# Patient Record
Sex: MALE | Race: WHITE | Employment: FULL TIME | URBAN - METROPOLITAN AREA
[De-identification: names, ages, dates, MRNs, and addresses within clinical notes are randomized per-mention and may not be internally consistent; named-entity substitution may affect disease eponyms.]

---

## 2021-02-25 ENCOUNTER — APPOINTMENT (OUTPATIENT)
Dept: GENERAL RADIOLOGY | Age: 41
End: 2021-02-25
Payer: COMMERCIAL

## 2021-02-25 ENCOUNTER — HOSPITAL ENCOUNTER (OUTPATIENT)
Age: 41
Setting detail: OBSERVATION
Discharge: HOME | End: 2021-02-26
Attending: INTERNAL MEDICINE | Admitting: INTERNAL MEDICINE
Payer: COMMERCIAL

## 2021-02-25 DIAGNOSIS — R07.9 CHEST PAIN, UNSPECIFIED TYPE: Primary | ICD-10-CM

## 2021-02-25 LAB
ALBUMIN SERPL-MCNC: 4.2 G/DL (ref 3.5–4.6)
ALP BLD-CCNC: 100 U/L (ref 35–104)
ALT SERPL-CCNC: 22 U/L (ref 0–41)
ANION GAP SERPL CALCULATED.3IONS-SCNC: 10 MEQ/L (ref 9–15)
AST SERPL-CCNC: 24 U/L (ref 0–40)
BASOPHILS ABSOLUTE: 0.1 K/UL (ref 0–0.2)
BASOPHILS RELATIVE PERCENT: 0.8 %
BILIRUB SERPL-MCNC: 0.3 MG/DL (ref 0.2–0.7)
BUN BLDV-MCNC: 10 MG/DL (ref 6–20)
CALCIUM SERPL-MCNC: 8.7 MG/DL (ref 8.5–9.9)
CHLORIDE BLD-SCNC: 106 MEQ/L (ref 95–107)
CO2: 24 MEQ/L (ref 20–31)
CREAT SERPL-MCNC: 0.75 MG/DL (ref 0.7–1.2)
EKG ATRIAL RATE: 55 BPM
EKG P AXIS: 32 DEGREES
EKG P-R INTERVAL: 138 MS
EKG Q-T INTERVAL: 430 MS
EKG QRS DURATION: 98 MS
EKG QTC CALCULATION (BAZETT): 411 MS
EKG R AXIS: 25 DEGREES
EKG T AXIS: 19 DEGREES
EKG VENTRICULAR RATE: 55 BPM
EOSINOPHILS ABSOLUTE: 0.2 K/UL (ref 0–0.7)
EOSINOPHILS RELATIVE PERCENT: 2.2 %
GFR AFRICAN AMERICAN: >60
GFR NON-AFRICAN AMERICAN: >60
GLOBULIN: 3 G/DL (ref 2.3–3.5)
GLUCOSE BLD-MCNC: 82 MG/DL (ref 70–99)
HCT VFR BLD CALC: 43.6 % (ref 42–52)
HEMOGLOBIN: 14.8 G/DL (ref 14–18)
LYMPHOCYTES ABSOLUTE: 2.8 K/UL (ref 1–4.8)
LYMPHOCYTES RELATIVE PERCENT: 33.8 %
MCH RBC QN AUTO: 30.9 PG (ref 27–31.3)
MCHC RBC AUTO-ENTMCNC: 34 % (ref 33–37)
MCV RBC AUTO: 90.8 FL (ref 80–100)
MONOCYTES ABSOLUTE: 1 K/UL (ref 0.2–0.8)
MONOCYTES RELATIVE PERCENT: 12 %
NEUTROPHILS ABSOLUTE: 4.3 K/UL (ref 1.4–6.5)
NEUTROPHILS RELATIVE PERCENT: 51.2 %
PDW BLD-RTO: 13.7 % (ref 11.5–14.5)
PLATELET # BLD: 240 K/UL (ref 130–400)
POTASSIUM SERPL-SCNC: 3.9 MEQ/L (ref 3.4–4.9)
RBC # BLD: 4.81 M/UL (ref 4.7–6.1)
SARS-COV-2, NAAT: NOT DETECTED
SODIUM BLD-SCNC: 140 MEQ/L (ref 135–144)
TOTAL PROTEIN: 7.2 G/DL (ref 6.3–8)
TROPONIN: <0.01 NG/ML (ref 0–0.01)
TROPONIN: <0.01 NG/ML (ref 0–0.01)
WBC # BLD: 8.4 K/UL (ref 4.8–10.8)

## 2021-02-25 PROCEDURE — 71045 X-RAY EXAM CHEST 1 VIEW: CPT

## 2021-02-25 PROCEDURE — G0378 HOSPITAL OBSERVATION PER HR: HCPCS

## 2021-02-25 PROCEDURE — 85025 COMPLETE CBC W/AUTO DIFF WBC: CPT

## 2021-02-25 PROCEDURE — 93005 ELECTROCARDIOGRAM TRACING: CPT | Performed by: PERSONAL EMERGENCY RESPONSE ATTENDANT

## 2021-02-25 PROCEDURE — 84484 ASSAY OF TROPONIN QUANT: CPT

## 2021-02-25 PROCEDURE — 6370000000 HC RX 637 (ALT 250 FOR IP): Performed by: PERSONAL EMERGENCY RESPONSE ATTENDANT

## 2021-02-25 PROCEDURE — 80053 COMPREHEN METABOLIC PANEL: CPT

## 2021-02-25 PROCEDURE — 36415 COLL VENOUS BLD VENIPUNCTURE: CPT

## 2021-02-25 PROCEDURE — 87635 SARS-COV-2 COVID-19 AMP PRB: CPT

## 2021-02-25 PROCEDURE — 99285 EMERGENCY DEPT VISIT HI MDM: CPT

## 2021-02-25 RX ORDER — ONDANSETRON 2 MG/ML
4 INJECTION INTRAMUSCULAR; INTRAVENOUS EVERY 6 HOURS PRN
Status: DISCONTINUED | OUTPATIENT
Start: 2021-02-25 | End: 2021-02-26 | Stop reason: HOSPADM

## 2021-02-25 RX ORDER — SODIUM CHLORIDE 0.9 % (FLUSH) 0.9 %
10 SYRINGE (ML) INJECTION EVERY 12 HOURS SCHEDULED
Status: DISCONTINUED | OUTPATIENT
Start: 2021-02-25 | End: 2021-02-26 | Stop reason: HOSPADM

## 2021-02-25 RX ORDER — PROMETHAZINE HYDROCHLORIDE 12.5 MG/1
12.5 TABLET ORAL EVERY 6 HOURS PRN
Status: DISCONTINUED | OUTPATIENT
Start: 2021-02-25 | End: 2021-02-26 | Stop reason: HOSPADM

## 2021-02-25 RX ORDER — ACETAMINOPHEN 650 MG/1
650 SUPPOSITORY RECTAL EVERY 6 HOURS PRN
Status: DISCONTINUED | OUTPATIENT
Start: 2021-02-25 | End: 2021-02-26 | Stop reason: HOSPADM

## 2021-02-25 RX ORDER — SODIUM CHLORIDE 0.9 % (FLUSH) 0.9 %
10 SYRINGE (ML) INJECTION PRN
Status: DISCONTINUED | OUTPATIENT
Start: 2021-02-25 | End: 2021-02-26 | Stop reason: HOSPADM

## 2021-02-25 RX ORDER — NITROGLYCERIN 0.4 MG/1
0.4 TABLET SUBLINGUAL EVERY 5 MIN PRN
Status: DISCONTINUED | OUTPATIENT
Start: 2021-02-25 | End: 2021-02-26 | Stop reason: HOSPADM

## 2021-02-25 RX ORDER — ASPIRIN 81 MG/1
81 TABLET, CHEWABLE ORAL DAILY
Status: DISCONTINUED | OUTPATIENT
Start: 2021-02-26 | End: 2021-02-26 | Stop reason: HOSPADM

## 2021-02-25 RX ORDER — ACETAMINOPHEN 325 MG/1
650 TABLET ORAL EVERY 6 HOURS PRN
Status: DISCONTINUED | OUTPATIENT
Start: 2021-02-25 | End: 2021-02-26 | Stop reason: HOSPADM

## 2021-02-25 RX ORDER — POLYETHYLENE GLYCOL 3350 17 G/17G
17 POWDER, FOR SOLUTION ORAL DAILY PRN
Status: DISCONTINUED | OUTPATIENT
Start: 2021-02-25 | End: 2021-02-26 | Stop reason: HOSPADM

## 2021-02-25 RX ADMIN — NITROGLYCERIN 1 INCH: 20 OINTMENT TOPICAL at 20:25

## 2021-02-25 RX ADMIN — NITROGLYCERIN 0.4 MG: 0.4 TABLET, ORALLY DISINTEGRATING SUBLINGUAL at 18:55

## 2021-02-25 SDOH — HEALTH STABILITY: MENTAL HEALTH: HOW OFTEN DO YOU HAVE A DRINK CONTAINING ALCOHOL?: NEVER

## 2021-02-25 ASSESSMENT — ENCOUNTER SYMPTOMS
COLOR CHANGE: 0
NAUSEA: 0
BLOOD IN STOOL: 0
RHINORRHEA: 0
COUGH: 0
VOMITING: 0
SHORTNESS OF BREATH: 0
CHEST TIGHTNESS: 1
SORE THROAT: 0
ABDOMINAL PAIN: 0
DIARRHEA: 0

## 2021-02-25 ASSESSMENT — PAIN SCALES - GENERAL
PAINLEVEL_OUTOF10: 0
PAINLEVEL_OUTOF10: 4

## 2021-02-25 ASSESSMENT — PAIN DESCRIPTION - ORIENTATION: ORIENTATION: MID;LEFT

## 2021-02-25 ASSESSMENT — HEART SCORE: ECG: 0

## 2021-02-25 ASSESSMENT — PAIN DESCRIPTION - ONSET: ONSET: ON-GOING

## 2021-02-25 ASSESSMENT — PAIN DESCRIPTION - PAIN TYPE: TYPE: ACUTE PAIN

## 2021-02-25 NOTE — ED PROVIDER NOTES
[address] [facility] ED  eMERGENCY dEPARTMENT eNCOUnter      Pt Name: Nathaniel Sow  MRN: [id number]  [place] 1980  Date of evaluation: 2/25/2021  Provider: Mary Loaiza, [address]    Nathaniel Sow is a 35 y.o. male with PMHx of borderline HTN presents to the emergency department with chest pain. Pt does smoke almost 1 pack a day. He woke up this morning with left sided chest tightness that is worse with exertion and relieved with rest. He is lightheaded with paresthesias down left arm. He has never had this pain before. He does admit to normal life stress but nothing significant. He has never had this pain before. He states his father in his late 42's had his first MI and then passed away of 2nd MI. He denies fevers, cough, shortness of breath, abdominal pain, nausea, vomiting. He took aspirin 324 mg this morning with some relief of pain. Minimal chest pain at this time. HPI    Nursing Notes were reviewed. REVIEW OF SYSTEMS       Review of Systems   Constitutional: Negative for appetite change, chills and fever. HENT: Negative for congestion, rhinorrhea and sore throat. Respiratory: Positive for chest tightness. Negative for cough and shortness of breath. Cardiovascular: Positive for chest pain. Gastrointestinal: Negative for abdominal pain, blood in stool, diarrhea, nausea and vomiting. Genitourinary: Negative for difficulty urinating. Musculoskeletal: Negative for neck stiffness. Skin: Negative for color change and rash. Neurological: Positive for light-headedness. Negative for dizziness, syncope, weakness, numbness and headaches. All other systems reviewed and are negative.             PAST MEDICAL HISTORY     Past Medical History:   Diagnosis Date    Hypertension          SURGICAL HISTORY       Past Surgical History:   Procedure Laterality Date    ANKLE FRACTURE SURGERY           CURRENT MEDICATIONS       Previous Medications No medications on file       ALLERGIES     Patient has no known allergies. FAMILY HISTORY     History reviewed. No pertinent family history. SOCIAL HISTORY       Social History     Socioeconomic History    Marital status:      Spouse name: None    Number of children: None    Years of education: None    Highest education level: None   Occupational History    None   Social Needs    Financial resource strain: None    Food insecurity     Worry: None     Inability: None    Transportation needs     Medical: None     Non-medical: None   Tobacco Use    Smoking status: Current Every Day Smoker     Packs/day: 0.50     Types: Cigarettes    Smokeless tobacco: Former User   Substance and Sexual Activity    Alcohol use: Never     Frequency: Never    Drug use: Never    Sexual activity: None   Lifestyle    Physical activity     Days per week: None     Minutes per session: None    Stress: None   Relationships    Social connections     Talks on phone: None     Gets together: None     Attends Latter-day service: None     Active member of club or organization: None     Attends meetings of clubs or organizations: None     Relationship status: None    Intimate partner violence     Fear of current or ex partner: None     Emotionally abused: None     Physically abused: None     Forced sexual activity: None   Other Topics Concern    None   Social History Narrative    None         PHYSICAL EXAM         ED Triage Vitals [02/25/21 1837]   BP Temp Temp Source Pulse Resp SpO2 Height Weight   (!) 163/89 98 °F (36.7 °C) Oral 83 22 99 % 6' (1.829 m) 235 lb (106.6 kg)       Physical Exam  Constitutional:       Appearance: He is well-developed. HENT:      Head: Normocephalic and atraumatic. Eyes:      Conjunctiva/sclera: Conjunctivae normal.      Pupils: Pupils are equal, round, and reactive to light. Neck:      Musculoskeletal: Normal range of motion and neck supple. Trachea: No tracheal deviation. Cardiovascular:      Heart sounds: Normal heart sounds. Pulmonary:      Effort: Pulmonary effort is normal. No respiratory distress. Breath sounds: Normal breath sounds. No stridor. Chest:      Chest wall: No tenderness. Abdominal:      General: Bowel sounds are normal. There is no distension. Palpations: Abdomen is soft. There is no mass. Tenderness: There is no abdominal tenderness. There is no guarding or rebound. Musculoskeletal: Normal range of motion. Skin:     General: Skin is warm and dry. Capillary Refill: Capillary refill takes less than 2 seconds. Findings: No rash. Neurological:      Mental Status: He is alert and oriented to person, place, and time. Deep Tendon Reflexes: Reflexes are normal and symmetric. Psychiatric:         Behavior: Behavior normal.         Thought Content: Thought content normal.         Judgment: Judgment normal.         DIAGNOSTIC RESULTS     EKG:All EKG's are interpreted by the Emergency Department Physician who either signs or Co-signs this chart in the absence of a cardiologist.    Sinus bradycardia, rate 55, normal intervals, normal axis, no ST segment changes    RADIOLOGY:   Non-plain film images such as CT, Ultrasound and MRI are read by theradiologist. Plain radiographic images are visualized and preliminarily interpreted by the emergency physician with the below findings:    Interpretation per theRadiologist below, if available at the time of this note:    XR CHEST PORTABLE   Final Result   No radiographic evidence of acute intrathoracic process. LABS:  Labs Reviewed   CBC WITH AUTO DIFFERENTIAL - Abnormal; Notable for the following components:       Result Value    Monocytes Absolute 1.0 (*)     All other components within normal limits   COVID-19, RAPID   COMPREHENSIVE METABOLIC PANEL   TROPONIN       All other labs were within normal range or not returned as of this dictation. EMERGENCY DEPARTMENT COURSE and DIFFERENTIAL DIAGNOSIS/MDM:   Vitals:    Vitals:    02/25/21 1837 02/25/21 1855 02/25/21 1900 02/25/21 1905   BP: (!) 163/89 135/80 139/78 128/71   Pulse: 83 63 69 74   Resp: 22 19 17 17   Temp: 98 °F (36.7 °C)      TempSrc: Oral      SpO2: 99% 97% 94% 95%   Weight: 235 lb (106.6 kg)      Height: 6' (1.829 m)            MDM    Heart score 3. Chest x-ray shows no acute process. Lab work unremarkable. Troponin negative. Hypertension upon initial arrival has improved to 128/71. Patient was given nitro sublingual x2 with relief of chest pain and left arm paresthesias. Nitropaste applied. Due to patient's risk factors including tobacco abuse, borderline hypertension, and significant pain history, patient remained at this time for cardiac rule out. 0        CRITICAL CARE TIME   Total Critical Caretime was 0 minutes, excluding separately reportable procedures. There was a high probability of clinically significant/life threatening deterioration in the patient's condition which required my urgent intervention. Procedures    FINAL IMPRESSION      1. Chest pain, unspecified type          DISPOSITION/PLAN   DISPOSITION Decision To Admit 02/25/2021 07:15:20 PM      PATIENT REFERRED TO:  No follow-up provider specified. DISCHARGE MEDICATIONS:  New Prescriptions    No medications on file          (Please notethat portions of this note were completed with a voice recognition program.  Efforts were made to edit the dictations but occasionally words are mis-transcribed. )    CORNELIA Hernandez (electronically signed)  Emergency Physician Assistant         Cleopatra Resendezma  02/25/21 2009

## 2021-02-25 NOTE — ED TRIAGE NOTES
Patient came to the ED for mid-left sided chest pain that began this morning. Patient states the pain subsides with rest. Patient describes the pain as \"tightness\" and states the pain does not radiate. Respirations even and unlabored. Patient took 325mg ASA this morning.

## 2021-02-26 VITALS
TEMPERATURE: 97.5 F | SYSTOLIC BLOOD PRESSURE: 133 MMHG | OXYGEN SATURATION: 96 % | HEART RATE: 58 BPM | HEIGHT: 72 IN | BODY MASS INDEX: 31.83 KG/M2 | WEIGHT: 235 LBS | DIASTOLIC BLOOD PRESSURE: 58 MMHG | RESPIRATION RATE: 18 BRPM

## 2021-02-26 LAB
HCT VFR BLD CALC: 42.1 % (ref 42–52)
HEMOGLOBIN: 14 G/DL (ref 14–18)
LV EF: 65 %
LVEF MODALITY: NORMAL
MCH RBC QN AUTO: 30.3 PG (ref 27–31.3)
MCHC RBC AUTO-ENTMCNC: 33.2 % (ref 33–37)
MCV RBC AUTO: 91.3 FL (ref 80–100)
PDW BLD-RTO: 13.9 % (ref 11.5–14.5)
PLATELET # BLD: 228 K/UL (ref 130–400)
RBC # BLD: 4.61 M/UL (ref 4.7–6.1)
TROPONIN: <0.01 NG/ML (ref 0–0.01)
WBC # BLD: 9 K/UL (ref 4.8–10.8)

## 2021-02-26 PROCEDURE — 93010 ELECTROCARDIOGRAM REPORT: CPT | Performed by: INTERNAL MEDICINE

## 2021-02-26 PROCEDURE — 93306 TTE W/DOPPLER COMPLETE: CPT

## 2021-02-26 PROCEDURE — 85027 COMPLETE CBC AUTOMATED: CPT

## 2021-02-26 PROCEDURE — APPSS60 APP SPLIT SHARED TIME 46-60 MINUTES: Performed by: PHYSICIAN ASSISTANT

## 2021-02-26 PROCEDURE — 6370000000 HC RX 637 (ALT 250 FOR IP): Performed by: PERSONAL EMERGENCY RESPONSE ATTENDANT

## 2021-02-26 PROCEDURE — 36415 COLL VENOUS BLD VENIPUNCTURE: CPT

## 2021-02-26 PROCEDURE — G0378 HOSPITAL OBSERVATION PER HR: HCPCS

## 2021-02-26 PROCEDURE — 99220 PR INITIAL OBSERVATION CARE/DAY 70 MINUTES: CPT | Performed by: INTERNAL MEDICINE

## 2021-02-26 PROCEDURE — 84484 ASSAY OF TROPONIN QUANT: CPT

## 2021-02-26 PROCEDURE — 2580000003 HC RX 258: Performed by: PERSONAL EMERGENCY RESPONSE ATTENDANT

## 2021-02-26 RX ADMIN — ASPIRIN 81 MG: 81 TABLET, CHEWABLE ORAL at 07:56

## 2021-02-26 RX ADMIN — SODIUM CHLORIDE, PRESERVATIVE FREE 10 ML: 5 INJECTION INTRAVENOUS at 07:56

## 2021-02-26 SDOH — HEALTH STABILITY: MENTAL HEALTH: HOW MANY STANDARD DRINKS CONTAINING ALCOHOL DO YOU HAVE ON A TYPICAL DAY?: NOT ASKED

## 2021-02-26 SDOH — HEALTH STABILITY: MENTAL HEALTH: HOW OFTEN DO YOU HAVE A DRINK CONTAINING ALCOHOL?: MONTHLY OR LESS

## 2021-02-26 ASSESSMENT — PAIN SCALES - GENERAL
PAINLEVEL_OUTOF10: 0

## 2021-02-26 ASSESSMENT — ENCOUNTER SYMPTOMS
VOMITING: 0
ABDOMINAL PAIN: 0
CHEST TIGHTNESS: 1
SHORTNESS OF BREATH: 0
NAUSEA: 0
COLOR CHANGE: 0

## 2021-02-26 ASSESSMENT — PAIN DESCRIPTION - ONSET: ONSET: OTHER (COMMENT)

## 2021-02-26 ASSESSMENT — PAIN DESCRIPTION - PAIN TYPE: TYPE: ACUTE PAIN;CHRONIC PAIN

## 2021-02-26 NOTE — PROGRESS NOTES
Patient given discharge instructions. Advised to call the DrChonwith any questions or concerns. Patient given physicians for possible PCP;  Presently has no PCP. Patient without questions or concerns. NCP deleted. Stable. No CP presently. Will d/c as ordered.

## 2021-02-26 NOTE — H&P
History and Physical  Patient: Yoana Martin  Unit/Bed:W185/W185-01  YOB: 1980  MRN: [id number]  Acct: [de-identified]   Admitting Diagnosis: Chest pain [R07.9]  Admit Date:  2/25/2021  Hospital Day: 0      Chief Complaint:   Chest pain    History of Present Illness: This is a very pleasant 70-year-old  male with no significant past medical history who presented to the emergency room yesterday with chief complaint of chest pain. He states that he awoke yesterday morning with midsternal to left-sided chest discomfort and tightness. He proceeded to going to work yesterday and states that the chest pain persisted. He denied any worsening chest pain with exertion. Rated the pain at a 6 out of 10 on a 1-10 pain scale. He had some radiation of the chest discomfort into his left arm with some left upper arm numbness and associated dizziness. He denies shortness of breath or dyspnea on exertion, nausea, vomiting, palpitations, diaphoresis, paroxysmal nocturnal dyspnea, orthopnea, lower extremity edema, syncope, fever or chills. Due to this persistent pain he presented to ER for further evaluation. On presentation to the emergency room, blood pressure elevated 163/89, heart rate 83, respiratory rate 22, pulse ox 99%, temperature 98 °F.  Sodium 140, potassium 3.9, chloride 106, total CO2 24, BUN 10, creatinine 0.75, GFR greater than 60, glucose 82. Initial troponin negative less than 0.010. WBC 8.4, hemoglobin 14.8, hematocrit 43.6, platelets 564. Rapid Covid testing negative. Chest x-ray revealed no radiographic evidence of acute intrathoracic process. He received 2 sublingual nitroglycerin in the ER with resolution of his chest pain. He was admitted for further evaluation. At time of evaluation today, patient is seen on 1 W. resting comfortably and in no acute distress. His serial troponins have been negative x3 at less than 0.010. He is hemodynamically stable. On telemetry he is sinus bradycardia with heart rate in the 50s. Telemetry alarms reviewed show heart rate as low as 46 around 3 AM this morning. He has no known prior cardiac history including history of myocardial infarction, congestive heart failure or arrhythmia. He denies any history of CVA or TIA. He has had no prior cardiac testing.     No Known Allergies    Current Facility-Administered Medications   Medication Dose Route Frequency Provider Last Rate Last Admin    nitroGLYCERIN (NITROSTAT) SL tablet 0.4 mg  0.4 mg Sublingual Q5 Min PRN CORNELIA Julien   0.4 mg at 02/25/21 1855    sodium chloride flush 0.9 % injection 10 mL  10 mL Intravenous 2 times per day CORNELIA Julien   10 mL at 02/26/21 0756    sodium chloride flush 0.9 % injection 10 mL  10 mL Intravenous PRN CORNELIA Julien        promethazine (PHENERGAN) tablet 12.5 mg  12.5 mg Oral Q6H PRN CORNELIA Julien        Or    ondansetron [facility]) injection 4 mg  4 mg Intravenous Q6H PRN CORNELIA Julien        acetaminophen (TYLENOL) tablet 650 mg  650 mg Oral Q6H PRN CORNELIA Julien        Or    acetaminophen (TYLENOL) suppository 650 mg  650 mg Rectal Q6H PRN CORNELIA Julien        polyethylene glycol (GLYCOLAX) packet 17 g  17 g Oral Daily PRN CORNELIA Julien        aspirin chewable tablet 81 mg  81 mg Oral Daily Rigoberto Jarvis [address]   81 mg at 02/26/21 0756       PMHx:  Past Medical History:   Diagnosis Date    Hypertension        PSHx:  Past Surgical History:   Procedure Laterality Date    ANKLE FRACTURE SURGERY      FOOT FRACTURE SURGERY Right [id number]    right       Social Hx:  Social History     Socioeconomic History    Marital status:      Spouse name: None    Number of children: None  Years of education: None    Highest education level: None   Occupational History    None   Social Needs    Financial resource strain: None    Food insecurity     Worry: None     Inability: None    Transportation needs     Medical: None     Non-medical: None   Tobacco Use    Smoking status: Current Every Day Smoker     Packs/day: 0.50     Years: 15.00     Pack years: 7.50     Types: Cigarettes    Smokeless tobacco: Former User   Substance and Sexual Activity    Alcohol use: Yes     Frequency: Never     Comment: 1 every month    Drug use: Never    Sexual activity: None   Lifestyle    Physical activity     Days per week: None     Minutes per session: None    Stress: None   Relationships    Social connections     Talks on phone: None     Gets together: None     Attends Scientologist service: None     Active member of club or organization: None     Attends meetings of clubs or organizations: None     Relationship status: None    Intimate partner violence     Fear of current or ex partner: None     Emotionally abused: None     Physically abused: None     Forced sexual activity: None   Other Topics Concern    None   Social History Narrative    None       Family Hx:  Family History   Problem Relation Age of Onset    Heart Disease Father        Review ofSystems:   Review of Systems   Constitutional: Negative for activity change and fever. HENT: Negative for congestion. Respiratory: Positive for chest tightness. Negative for shortness of breath. Cardiovascular: Positive for chest pain. Negative for palpitations and leg swelling. Gastrointestinal: Negative for abdominal pain, nausea and vomiting. Genitourinary: Negative for difficulty urinating. Musculoskeletal: Negative for arthralgias. Skin: Negative for color change, pallor and rash. Neurological: Positive for dizziness. Negative for syncope. Psychiatric/Behavioral: Negative for agitation and behavioral problems. Physical Examination:    BP (!) 133/58   Pulse 58   Temp 97.5 °F (36.4 °C) (Oral)   Resp 18   Ht 6' (1.829 m)   Wt 235 lb (106.6 kg)   SpO2 96%   BMI 31.87 kg/m²    Physical Exam  Constitutional:       General: He is not in acute distress. Appearance: Normal appearance. HENT:      Head: Normocephalic and atraumatic. Neck:      Musculoskeletal: Normal range of motion and neck supple. Cardiovascular:      Rate and Rhythm: Regular rhythm. Bradycardia present. Pulmonary:      Effort: Pulmonary effort is normal. No respiratory distress. Breath sounds: No wheezing, rhonchi or rales. Abdominal:      Palpations: Abdomen is soft. Tenderness: There is no abdominal tenderness. Musculoskeletal: Normal range of motion. Right lower leg: No edema. Left lower leg: No edema. Skin:     General: Skin is warm and dry. Neurological:      General: No focal deficit present. Mental Status: He is alert and oriented to person, place, and time. Cranial Nerves: No cranial nerve deficit.    Psychiatric:         Mood and Affect: Mood normal.         Behavior: Behavior normal.          LABS:  CBC:   Lab Results   Component Value Date    WBC 9.0 02/26/2021    RBC 4.61 02/26/2021    HGB 14.0 02/26/2021    HCT 42.1 02/26/2021    MCV 91.3 02/26/2021    MCH 30.3 02/26/2021    MCHC 33.2 02/26/2021    RDW 13.9 02/26/2021     02/26/2021     CBC with Differential:   Lab Results   Component Value Date    WBC 9.0 02/26/2021    RBC 4.61 02/26/2021    HGB 14.0 02/26/2021    HCT 42.1 02/26/2021     02/26/2021    MCV 91.3 02/26/2021    MCH 30.3 02/26/2021    MCHC 33.2 02/26/2021    RDW 13.9 02/26/2021    LYMPHOPCT 33.8 02/25/2021    MONOPCT 12.0 02/25/2021    BASOPCT 0.8 02/25/2021    MONOSABS 1.0 02/25/2021    LYMPHSABS 2.8 02/25/2021    EOSABS 0.2 02/25/2021    BASOSABS 0.1 02/25/2021     CMP:    Lab Results   Component Value Date     02/25/2021    K 3.9 02/25/2021  02/25/2021    CO2 24 02/25/2021    BUN 10 02/25/2021    CREATININE 0.75 02/25/2021    GFRAA >60.0 02/25/2021    LABGLOM >60.0 02/25/2021    GLUCOSE 82 02/25/2021    PROT 7.2 02/25/2021    LABALBU 4.2 02/25/2021    CALCIUM 8.7 02/25/2021    BILITOT 0.3 02/25/2021    ALKPHOS 100 02/25/2021    AST 24 02/25/2021    ALT 22 02/25/2021     BMP:    Lab Results   Component Value Date     02/25/2021    K 3.9 02/25/2021     02/25/2021    CO2 24 02/25/2021    BUN 10 02/25/2021    LABALBU 4.2 02/25/2021    CREATININE 0.75 02/25/2021    CALCIUM 8.7 02/25/2021    GFRAA >60.0 02/25/2021    LABGLOM >60.0 02/25/2021    GLUCOSE 82 02/25/2021     Magnesium:  No results found for: MG  Troponin:    Lab Results   Component Value Date    TROPONINI <0.010 02/26/2021     No results for input(s): PROBNP in the last 72 hours. No results for input(s): INR in the last 72 hours. RADIOLOGY:  Xr Chest Portable    Result Date: 2/25/2021  Exam: XR CHEST PORTABLE History:  cp Technique: AP portable view of the chest obtained. Comparison: None Chest x-ray portable Findings: The cardiomediastinal silhouette is within normal limits. There are no infiltrates, consolidations or effusions. Bones of the thorax appear intact. No radiographic evidence of acute intrathoracic process. EKG 2/25/21: SB 55, Q wave and T wave inversion lead III, QTc 411ms    Telemetry 2/26/21: SB 50s, HR down to 46bpm overnight      Assessment:    Active Hospital Problems    Diagnosis Date Noted    Chest pain [R07.9] 02/25/2021     1. Chest pain  2. Tobacco abuse  3. Sinus bradycardia    Plan:  1. Admit to telemetry  2. ACS orders initiated  3. Maximize medical therapyaspirin 81 mg p.o. daily, no beta-blocker or any other negative chronotropic agents secondary to sinus bradycardia, sublingual nitroglycerin as needed for chest pain  4. Check echocardiogram  5.  Monitor on telemetry for any tachycardia or bradycardia arrhythmias 6. Maintain potassium greater than 4, magnesium greater than 2  7. GI/DVT prophylaxis  8. Coronary evaluation per Dr. Caryl Arreola outpatient stress test as serial troponins negative x3, EKG benign and patient currently chest pain-free.   9. Further recommendations to follow        Electronically signed by CORNELIA Rueda on 2/26/2021 at 9:03 AM

## 2021-02-26 NOTE — PROGRESS NOTES
Spiritual Care Services     Summary of Visit:  Initial visit. Patient is a very pleasant gentleman that I find pacing the doorway. He is anxious to get home. Patient explained that he has a special needs daughter who is concerned about him. Patient is very open and speaks about his daughter and her condition (Betito Syndrome) and of how much of a blessing she has been to him. She has a shortened life span but he has reconciled himself to that and wants to enjoy each moment with her. Patient expressed that he is at peace with his circumstances and is resting in God.  offered emotional support and prayer. Spiritual Assessment/Intervention/Outcomes:    Encounter Summary  Services provided to[de-identified] Patient  Referral/Consult From[de-identified] Rounding  Support System: Spouse, Children, Family members  Place of [facility]: None  Continue Visiting: No  Complexity of Encounter: Moderate  Length of Encounter: 15 minutes  Spiritual Assessment Completed: Yes  Routine  Type: Initial  Assessment: Calm, Approachable, Hopeful, Coping  Intervention: Active listening, Explored feelings, thoughts, concerns, Nurtured hope, Prayer, Discussed relationship with God, Discussed belief system/Uatsdin practices/janet, Discussed illness/injury and it's impact  Outcome: Expressed gratitude, Acceptance, Engaged in conversation, Shared reminiscences, Expressed feelings/needs/concerns, Encouraged              Advance Directives (For Healthcare)  Pre-existing DNR Comfort Care/DNR Arrest/DNI Order: No           Values / Beliefs  Do you have any ethnic, cultural, sacramental, or spiritual Uatsdin needs you would like us to be aware of while you are in the hospital?: No    Care Plan:    No follow up required as patient to be discharged.     Spiritual Care Services   Electronically signed by Summer Salguero on 2/26/21 at 2:53 PM EST     To reach a  for emotional and spiritual support, place an [facility] consult request.

## 2021-02-26 NOTE — ED NOTES
Pt states his chest pressure is completely subsided after two nitro tabs.      Alan Evans RN  02/25/21 9643

## 2021-02-26 NOTE — PROGRESS NOTES
2135- Received patient from ER. Alert ,oriented and cooperative. Follows commands and is independent. Denies chest pain at this time. 2230- Admission completed.   0100- Sleeping with out complaints

## 2021-02-26 NOTE — CARE COORDINATION
Pt states that he has no pcp. He is given a list of the [facility] pcp's and was encouraged to choose one and call for an appt. He states understanding.

## 2021-03-08 ENCOUNTER — OFFICE VISIT (OUTPATIENT)
Dept: CARDIOLOGY CLINIC | Age: 41
End: 2021-03-08
Payer: COMMERCIAL

## 2021-03-08 VITALS
BODY MASS INDEX: 31.69 KG/M2 | WEIGHT: 234 LBS | SYSTOLIC BLOOD PRESSURE: 158 MMHG | HEART RATE: 65 BPM | OXYGEN SATURATION: 98 % | HEIGHT: 72 IN | DIASTOLIC BLOOD PRESSURE: 82 MMHG

## 2021-03-08 DIAGNOSIS — R07.9 CHEST PAIN, UNSPECIFIED TYPE: Primary | ICD-10-CM

## 2021-03-08 PROCEDURE — 1111F DSCHRG MED/CURRENT MED MERGE: CPT | Performed by: INTERNAL MEDICINE

## 2021-03-08 PROCEDURE — 99214 OFFICE O/P EST MOD 30 MIN: CPT | Performed by: INTERNAL MEDICINE

## 2021-03-08 ASSESSMENT — ENCOUNTER SYMPTOMS
CHEST TIGHTNESS: 0
SHORTNESS OF BREATH: 1
GASTROINTESTINAL NEGATIVE: 1
ALLERGIC/IMMUNOLOGIC NEGATIVE: 1
EYES NEGATIVE: 1

## 2021-03-09 ASSESSMENT — ENCOUNTER SYMPTOMS
APNEA: 0
FACIAL SWELLING: 0
VOICE CHANGE: 0
ABDOMINAL DISTENTION: 0
WHEEZING: 0
BLOOD IN STOOL: 0
NAUSEA: 0
TROUBLE SWALLOWING: 0
COLOR CHANGE: 0
ANAL BLEEDING: 0
VOMITING: 0

## 2023-11-03 DIAGNOSIS — M85.80 OSTEOPENIA, UNSPECIFIED LOCATION: Primary | ICD-10-CM

## 2023-11-03 RX ORDER — ALENDRONATE SODIUM 70 MG/1
70 TABLET ORAL
Qty: 4 TABLET | Refills: 11 | Status: SHIPPED | OUTPATIENT
Start: 2023-11-03 | End: 2024-11-02